# Patient Record
Sex: MALE | Race: WHITE | Employment: FULL TIME | URBAN - METROPOLITAN AREA
[De-identification: names, ages, dates, MRNs, and addresses within clinical notes are randomized per-mention and may not be internally consistent; named-entity substitution may affect disease eponyms.]

---

## 2021-08-02 ENCOUNTER — HOSPITAL ENCOUNTER (EMERGENCY)
Age: 31
Discharge: HOME | End: 2021-08-02
Attending: EMERGENCY MEDICINE

## 2021-08-02 ENCOUNTER — APPOINTMENT (OUTPATIENT)
Dept: GENERAL RADIOLOGY | Age: 31
End: 2021-08-02

## 2021-08-02 VITALS
BODY MASS INDEX: 35.54 KG/M2 | HEART RATE: 98 BPM | RESPIRATION RATE: 18 BRPM | OXYGEN SATURATION: 98 % | DIASTOLIC BLOOD PRESSURE: 64 MMHG | SYSTOLIC BLOOD PRESSURE: 150 MMHG | WEIGHT: 262.4 LBS | TEMPERATURE: 98.5 F | HEIGHT: 72 IN

## 2021-08-02 DIAGNOSIS — R07.9 ACUTE NONSPECIFIC CHEST PAIN WITH LOW RISK OF CORONARY ARTERY DISEASE: Primary | ICD-10-CM

## 2021-08-02 DIAGNOSIS — Z91.89 ACUTE NONSPECIFIC CHEST PAIN WITH LOW RISK OF CORONARY ARTERY DISEASE: Primary | ICD-10-CM

## 2021-08-02 DIAGNOSIS — R94.31 T WAVE INVERSION IN EKG: ICD-10-CM

## 2021-08-02 DIAGNOSIS — R51.9 NONINTRACTABLE HEADACHE, UNSPECIFIED CHRONICITY PATTERN, UNSPECIFIED HEADACHE TYPE: ICD-10-CM

## 2021-08-02 LAB
A/G RATIO: 1.5 (ref 1.1–2.2)
ALBUMIN SERPL-MCNC: 4.3 G/DL (ref 3.4–5)
ALP BLD-CCNC: 60 U/L (ref 40–129)
ALT SERPL-CCNC: 44 U/L (ref 10–40)
ANION GAP SERPL CALCULATED.3IONS-SCNC: 10 MMOL/L (ref 3–16)
AST SERPL-CCNC: 26 U/L (ref 15–37)
BASOPHILS ABSOLUTE: 0.1 K/UL (ref 0–0.2)
BASOPHILS RELATIVE PERCENT: 0.6 %
BILIRUB SERPL-MCNC: 0.4 MG/DL (ref 0–1)
BUN BLDV-MCNC: 17 MG/DL (ref 7–20)
CALCIUM SERPL-MCNC: 9.2 MG/DL (ref 8.3–10.6)
CHLORIDE BLD-SCNC: 104 MMOL/L (ref 99–110)
CO2: 23 MMOL/L (ref 21–32)
CREAT SERPL-MCNC: 0.8 MG/DL (ref 0.9–1.3)
D DIMER: <200 NG/ML DDU (ref 0–229)
EKG ATRIAL RATE: 90 BPM
EKG DIAGNOSIS: NORMAL
EKG P AXIS: 24 DEGREES
EKG P-R INTERVAL: 158 MS
EKG Q-T INTERVAL: 348 MS
EKG QRS DURATION: 76 MS
EKG QTC CALCULATION (BAZETT): 425 MS
EKG R AXIS: 42 DEGREES
EKG T AXIS: 41 DEGREES
EKG VENTRICULAR RATE: 90 BPM
EOSINOPHILS ABSOLUTE: 0.2 K/UL (ref 0–0.6)
EOSINOPHILS RELATIVE PERCENT: 1.5 %
GFR AFRICAN AMERICAN: >60
GFR NON-AFRICAN AMERICAN: >60
GLOBULIN: 2.9 G/DL
GLUCOSE BLD-MCNC: 118 MG/DL (ref 70–99)
HCT VFR BLD CALC: 46.1 % (ref 40.5–52.5)
HEMOGLOBIN: 15.6 G/DL (ref 13.5–17.5)
LIPASE: 30 U/L (ref 13–60)
LYMPHOCYTES ABSOLUTE: 3.2 K/UL (ref 1–5.1)
LYMPHOCYTES RELATIVE PERCENT: 30.5 %
MCH RBC QN AUTO: 30.5 PG (ref 26–34)
MCHC RBC AUTO-ENTMCNC: 33.7 G/DL (ref 31–36)
MCV RBC AUTO: 90.4 FL (ref 80–100)
MONOCYTES ABSOLUTE: 1 K/UL (ref 0–1.3)
MONOCYTES RELATIVE PERCENT: 10.1 %
NEUTROPHILS ABSOLUTE: 5.9 K/UL (ref 1.7–7.7)
NEUTROPHILS RELATIVE PERCENT: 57.3 %
PDW BLD-RTO: 14.4 % (ref 12.4–15.4)
PLATELET # BLD: 273 K/UL (ref 135–450)
PMV BLD AUTO: 8 FL (ref 5–10.5)
POTASSIUM SERPL-SCNC: 4.4 MMOL/L (ref 3.5–5.1)
PRO-BNP: 26 PG/ML (ref 0–124)
RBC # BLD: 5.1 M/UL (ref 4.2–5.9)
SARS-COV-2, NAAT: NOT DETECTED
SODIUM BLD-SCNC: 137 MMOL/L (ref 136–145)
TOTAL PROTEIN: 7.2 G/DL (ref 6.4–8.2)
TROPONIN: <0.01 NG/ML
TROPONIN: <0.01 NG/ML
WBC # BLD: 10.3 K/UL (ref 4–11)

## 2021-08-02 PROCEDURE — 83690 ASSAY OF LIPASE: CPT

## 2021-08-02 PROCEDURE — 93005 ELECTROCARDIOGRAM TRACING: CPT | Performed by: EMERGENCY MEDICINE

## 2021-08-02 PROCEDURE — 80053 COMPREHEN METABOLIC PANEL: CPT

## 2021-08-02 PROCEDURE — 71046 X-RAY EXAM CHEST 2 VIEWS: CPT

## 2021-08-02 PROCEDURE — 87635 SARS-COV-2 COVID-19 AMP PRB: CPT

## 2021-08-02 PROCEDURE — 85379 FIBRIN DEGRADATION QUANT: CPT

## 2021-08-02 PROCEDURE — 84484 ASSAY OF TROPONIN QUANT: CPT

## 2021-08-02 PROCEDURE — 99283 EMERGENCY DEPT VISIT LOW MDM: CPT

## 2021-08-02 PROCEDURE — 85025 COMPLETE CBC W/AUTO DIFF WBC: CPT

## 2021-08-02 PROCEDURE — 93005 ELECTROCARDIOGRAM TRACING: CPT | Performed by: PHYSICIAN ASSISTANT

## 2021-08-02 PROCEDURE — 83880 ASSAY OF NATRIURETIC PEPTIDE: CPT

## 2021-08-02 PROCEDURE — 93010 ELECTROCARDIOGRAM REPORT: CPT | Performed by: INTERNAL MEDICINE

## 2021-08-02 RX ORDER — DEXTROAMPHETAMINE SACCHARATE, AMPHETAMINE ASPARTATE, DEXTROAMPHETAMINE SULFATE AND AMPHETAMINE SULFATE 7.5; 7.5; 7.5; 7.5 MG/1; MG/1; MG/1; MG/1
30 TABLET ORAL DAILY
COMMUNITY

## 2021-08-02 RX ORDER — GABAPENTIN 100 MG/1
100 CAPSULE ORAL EVERY 8 HOURS PRN
Qty: 20 CAPSULE | Refills: 0 | Status: SHIPPED | OUTPATIENT
Start: 2021-08-02 | End: 2021-08-12

## 2021-08-02 RX ORDER — NAPROXEN 500 MG/1
500 TABLET ORAL 2 TIMES DAILY WITH MEALS
Qty: 20 TABLET | Refills: 0 | Status: SHIPPED | OUTPATIENT
Start: 2021-08-02 | End: 2021-08-12

## 2021-08-02 ASSESSMENT — ENCOUNTER SYMPTOMS
RHINORRHEA: 0
VOMITING: 0
NAUSEA: 0
SHORTNESS OF BREATH: 0
DIARRHEA: 0
COUGH: 0
ABDOMINAL PAIN: 0

## 2021-08-02 ASSESSMENT — HEART SCORE
ECG: 1
ECG: 1

## 2021-08-02 ASSESSMENT — PAIN SCALES - GENERAL: PAINLEVEL_OUTOF10: 0

## 2021-08-02 NOTE — ED PROVIDER NOTES
I independently performed a history and physical on Cece Harrison. All diagnostic, treatment, and disposition decisions were made by myself in conjunction with the advanced practice provider. Briefly, this is a 32 y.o. male here for headache ongoing for the past week. Patient describes the pain starting in the back of his head and radiating forward to the top of his left scalp. Pain feels like \"jolts\" and lasts for seconds at a time, however feels severe when the pain occurs. Nothing seems to bring it on or make it worse. He denies any head injury or other trauma. Over the past 2 days he states he is having some sharp jolting pain in his left upper chest as well. Patient went to an urgent care where he had an EKG performed, was told it was abnormal and was instructed to proceed to the emergency department for further evaluation. At time of my evaluation he denies any current pain or other symptoms. On exam, patient afebrile and nontoxic. No distress. Heart RRR. Lungs CTAB. Abdomen soft, nondistended, nontender to palpation in all quadrants. A&Ox4. PERRL. There is tenderness over palpation of the left occiput. CN 2-12 intact. 5/5 motor and sensation grossly intact all extremities. No pronator drift. Normal gait observed.         EKG  EKG was reviewed by emergency department physician in the absence of a cardiologist    Narrow complex sinus rhythm, rate 90, normal axis, normal AK and QRS intervals, normal Qtc, no ST elevations or depressions, TWI V4-V6, impression NSR with nonspecific T wave morphology, no STEMI, no comparison available      Screenings     Heart Score for chest pain patients  History: Slightly Suspicious  ECG: Non-Specifc repolarization disturbance/LBTB/PM  Patient Age: < 45 years  *Risk factors for Atherosclerotic disease: Cigarette smoking, Positive family History, Obesity  Risk Factors: > 3 Risk factors or history of atherosclerotic disease*  Troponin: < 1X normal limit  Heart Score Total: 3      MDM    Patient afebrile and nontoxic. No distress. EKG with nonspecific lateral T wave inversions, no STEMI. A troponin is normal, ACS is not immediately evident. Patient symptoms not typical for angina. Chest x-ray evidence of pneumonia, pneumothorax or other acute process. D-dimer normal, low risk for pulmonary embolism. Patient had repeat EKG and troponin at 4 hours which were unchanged. He is low risk by HEART score and is currently chest pain-free. Neuro exam is nonfocal, no red flags for SAH/ICH for headache. Given his descriptions of his symptoms I question occipital neuralgia. Will trial patient on anti-inflammatories and low-dose gabapentin. Felt safe for discharge to self-care with close PCP and cardiology follow-up. I held lengthy discussion with patient regarding his symptoms and the results of testing, he is agreeable with plan and feels comfortable turning to home. Will be able to return easily should his symptoms change or worsen. Strict immediate return precautions were discussed. Patient Referrals:  Makeda Barcenas  [address], [address] [place] [address]  [phone]    In 2 days      Tito Wallace MD  [address] [facility]. [address] [facility] [address]    In 3 days  Cardiology - for your abnormal EKG      Discharge Medications:  New Prescriptions    GABAPENTIN (NEURONTIN) 100 MG CAPSULE    Take 1 capsule by mouth every 8 hours as needed (headache) for up to 10 days. Intended supply: 90 days    NAPROXEN (NAPROSYN) 500 MG TABLET    Take 1 tablet by mouth 2 times daily (with meals) for 10 days       FINAL IMPRESSION  1. Acute nonspecific chest pain with low risk of coronary artery disease    2. Nonintractable headache, unspecified chronicity pattern, unspecified headache type    3. T wave inversion in EKG        Blood pressure (!) 150/64, pulse 98, temperature 98.5 °F (36.9 °C), temperature source Oral, resp.  rate 18, height 6' (1.829 m), weight 262 lb 6.4 oz (119 kg), SpO2 98 %. For further details of [facility] emergency department encounter, please see documentation by advanced practice provider, TINY Plata.     Dana Hair DO (electronically signed)  Attending Emergency Physician       Dana Hair DO  08/03/21 6959

## 2021-08-02 NOTE — ED PROVIDER NOTES
EKG is reviewed by myself. Dated today at 18. Rate 90. Sinus rhythm. Some nonspecific T wave inversions in V4 through 6.   No priors     Sudheer Eli MD  08/02/21 2460

## 2021-08-02 NOTE — ED PROVIDER NOTES
[facility]        Pt Name: Danielle Cornejo  MRN: [id number]  [place] 1990  Date of evaluation: 8/2/2021  Provider: Claudio Cooper PA-C  PCP: Flip You [facility]  Note Started: 2:44 PM EDT        I have seen and evaluated this patient with my supervising physician Sudha Ga, [address] [place] [address]       Chief Complaint   Patient presents with    Chest Pain     pt having chest pain, sob, sharp pain in his head. went to urgent care, abn ekg, sent to ed       HISTORY OF PRESENT ILLNESS   (Location, Timing/Onset, Context/Setting, Quality, Duration, Modifying Factors, Severity, Associated Signs and Symptoms)  Note limiting factors. Chief Complaint: Chest pain    Danielle Cornejo is a 32 y.o. male who presents to the emergency department today for evaluation for chest pain, and shortness of breath. The patient states that over the past 2days he has been experiencing intermittent sharp pains to the left anterior chest and he states occasionally will have sharp pains to his head as well. Patient states that he will have associated shortness of breath with his pain. The patient states that he is asymptomatic at this time. He states that he did have pain earlier today, he states that he was seen at urgent care and he was sent to the emergency room because he had an abnormal EKG. Again when the patient arrived to the ED he is asymptomatic. The patient denies any history of hypertension diabetes or hyperlipidemia. He smokes a pack a day. He denies any drug use. The patient states that he recently traveled out [address] [place] [address], was out [address] [place] [address] for 3 weeks. He has no lower leg pain or swelling. He is no history of DVT or PE. He has no fever chills. No cough or congestion. He says that dad had an MI at age 46. Patient denies any abdominal pain, vomiting or diarrhea. He has no urinary symptoms.   The patient states that he has had symptoms similar in the past, he states that he was told that he had a panic attack over this is in his 25s. Again he is asymptomatic at this time otherwise has no complaints    Nursing Notes were all reviewed and agreed with or any disagreements were addressed in the HPI. REVIEW OF SYSTEMS    (2-9 systems for level 4, 10 or more for level 5)     Review of Systems   Constitutional: Negative for activity change, appetite change, chills and fever. HENT: Negative for congestion and rhinorrhea. Respiratory: Negative for cough and shortness of breath. Cardiovascular: Positive for chest pain. Gastrointestinal: Negative for abdominal pain, diarrhea, nausea and vomiting. Genitourinary: Negative for difficulty urinating, dysuria and hematuria. Positives and Pertinent negatives as per HPI. Except as noted above in the ROS, all other systems were reviewed and negative. PAST MEDICAL HISTORY   History reviewed. No pertinent past medical history. SURGICAL HISTORY     Past Surgical History:   Procedure Laterality Date    APPENDECTOMY           CURRENTMEDICATIONS       Discharge Medication List as of 8/2/2021  6:58 PM      CONTINUE these medications which have NOT CHANGED    Details   amphetamine-dextroamphetamine (ADDERALL) 30 MG tablet Take 30 mg by mouth daily. Historical Med               ALLERGIES     Patient has no known allergies.     FAMILYHISTORY       Family History   Problem Relation Age of Onset    Coronary Art Dis Father     Heart Attack Father     Heart Disease Father     Heart Disease Paternal Grandfather     High Blood Pressure Paternal Grandfather           SOCIAL HISTORY       Social History     Tobacco Use    Smoking status: Current Every Day Smoker     Packs/day: 1.00    Smokeless tobacco: Never Used   Substance Use Topics    Alcohol use: Not on file    Drug use: Not on file       SCREENINGS      Heart Score for chest pain patients  History: Slightly Suspicious  ECG: Non-Specifc repolarization disturbance/LBTB/PM  Patient Age: < 39 years  *Risk factors for Atherosclerotic disease: Cigarette smoking, Positive family History, Obesity  Risk Factors: > 3 Risk factors or history of atherosclerotic disease*  Troponin: < 1X normal limit  Heart Score Total: 3      PHYSICAL EXAM    (up to 7 for level 4, 8 or more for level 5)     ED Triage Vitals [08/02/21 1257]   BP Temp Temp Source Pulse Resp SpO2 Height Weight   (!) 150/64 98.5 °F (36.9 °C) Oral 98 18 98 % 6' (1.829 m) 262 lb 6.4 oz (119 kg)       Physical Exam  Vitals and nursing note reviewed. Constitutional:       Appearance: He is well-developed. He is not diaphoretic. HENT:      Head: Normocephalic and atraumatic. Right Ear: External ear normal.      Left Ear: External ear normal.      Nose: Nose normal.   Eyes:      General:         Right eye: No discharge. Left eye: No discharge. Neck:      Trachea: No tracheal deviation. Cardiovascular:      Rate and Rhythm: Normal rate and regular rhythm. Heart sounds: No murmur heard. Pulmonary:      Effort: Pulmonary effort is normal. No respiratory distress. Breath sounds: Normal breath sounds. No wheezing or rales. Abdominal:      General: Bowel sounds are normal. There is no distension. Palpations: Abdomen is soft. Tenderness: There is no abdominal tenderness. There is no guarding. Musculoskeletal:         General: Normal range of motion. Cervical back: Normal range of motion and neck supple. Skin:     General: Skin is warm and dry. Neurological:      Mental Status: He is alert and oriented to person, place, and time.    Psychiatric:         Behavior: Behavior normal.         DIAGNOSTIC RESULTS   LABS:    Labs Reviewed   COMPREHENSIVE METABOLIC PANEL - Abnormal; Notable for the following components:       Result Value    Glucose 118 (*)     CREATININE 0.8 (*)     ALT 44 (*)     All other components within normal limits    Narrative:     Performed at:  [facility]  [address],  [place], [address]   Phone [phone], RAPID    Narrative:     Performed at:  [facility]  [address], [address]   Phone [phone]   CBC WITH AUTO DIFFERENTIAL    Narrative:     Performed at:  [facility]  [address],  [place], [address]   Phone [phone]   LIPASE    Narrative:     Performed at:  [facility]  [address],  [place], [address]   Phone [phone]   TROPONIN    Narrative:     Performed at:  [facility] [address] [place], [address]   Phone [phone]   D-DIMER, QUANTITATIVE    Narrative:     Performed at:  [facility] [address],  [place], [address]   Phone [phone]   BRAIN NATRIURETIC PEPTIDE    Narrative:     Performed at:  [facility]  [address]  [place], [address]   Phone [phone]   TROPONIN    Narrative:     Performed at:  [facility] [address] [place], [address]   Phone [phone]       When ordered only abnormal lab results are displayed. All other labs were within normal range or not returned as of this dictation. EKG: When ordered, EKG's are interpreted by the Emergency Department Physician in the absence of a cardiologist.  Please see their note for interpretation of EKG.     RADIOLOGY:   Non-plain film images such as CT, Ultrasound and MRI are read by the radiologist. Plain radiographic images are visualized and preliminarily interpreted by the ED Provider with the below findings:        Interpretation per the Radiologist below, if available at the time of this note:    XR CHEST (2 VW)   Final Result   No active cardiopulmonary disease           XR CHEST (2 VW)    Result Date: 8/2/2021  EXAMINATION: TWO XRAY VIEWS OF THE CHEST 8/2/2021 1:49 pm COMPARISON: None. HISTORY: ORDERING SYSTEM PROVIDED HISTORY: Chest Discomfort TECHNOLOGIST PROVIDED HISTORY: Reason for exam:->Chest Discomfort Reason for Exam: chest discomfort Acuity: Acute Type of Exam: Initial FINDINGS: Heart size and pulmonary vasculature within normal limits. Lungs clear. Costophrenic angles sharp     No active cardiopulmonary disease           PROCEDURES   Unless otherwise noted below, none     Procedures    CRITICAL CARE TIME   N/A    CONSULTS:  None      EMERGENCY DEPARTMENT COURSE and DIFFERENTIAL DIAGNOSIS/MDM:   Vitals:    Vitals:    08/02/21 1257   BP: (!) 150/64   Pulse: 98   Resp: 18   Temp: 98.5 °F (36.9 °C)   TempSrc: Oral   SpO2: 98%   Weight: 262 lb 6.4 oz (119 kg)   Height: 6' (1.829 m)       Patient was given the following medications:  Medications - No data to display        Briefly, this is a 49-year-old male who presents to the emergency department today for evaluation for chest pain. The patient states that he has been experiencing intermittent chest pain to the left anterior chest which will occasionally radiate up into his head and he states that this is actually been ongoing for the past 2 days. He is asymptomatic at this time. He states that he was seen at urgent care and was sent to the ED for an abnormal EKG    Physical examination is unremarkable    EKG does show T wave inversion to the lateral leads, please see attending note for further details    Patient has a heart score of 1    CBC shows no evidence of leukocytosis or anemia. CMP is unremarkable. Lipase is normal.  Troponin negative. D-dimer negative. Rapid Covid is negative. Chest x-ray is now    Patient is low risk heart score, he is negative D-dimer with a low Wells score, therefore repeat troponin EKG will be obtained at 3-hour this marks in my shift, please see attending note for details and final disposition    FINAL IMPRESSION      1.  Acute nonspecific chest pain with low risk of coronary artery disease    2. Nonintractable headache, unspecified chronicity pattern, unspecified headache type    3. T wave inversion in EKG          DISPOSITION/PLAN   DISPOSITION Decision To Discharge 08/02/2021 06:47:59 PM      PATIENT REFERRED TO:  Carolyn Contreras  [address], [address] [facility]  [phone]    In 2 days      Js Harrison MD  [address] [facility]. [address]    In 3 days  Cardiology - for your abnormal EKG      DISCHARGE MEDICATIONS:  Discharge Medication List as of 8/2/2021  6:58 PM      START taking these medications    Details   naproxen (NAPROSYN) 500 MG tablet Take 1 tablet by mouth 2 times daily (with meals) for 10 days, Disp-20 tablet, R-0Print      gabapentin (NEURONTIN) 100 MG capsule Take 1 capsule by mouth every 8 hours as needed (headache) for up to 10 days.  Intended supply: 90 days, Disp-20 capsule, R-0Print             DISCONTINUED MEDICATIONS:  Discharge Medication List as of 8/2/2021  6:58 PM                 (Please note that portions of this note were completed with a voice recognition program.  Efforts were made to edit the dictations but occasionally words are mis-transcribed.)    Dee Baumgarten, PA-C (electronically signed)            Dee Baumgarten, PA-C  08/03/21 2189

## 2021-08-04 LAB
EKG ATRIAL RATE: 83 BPM
EKG DIAGNOSIS: NORMAL
EKG P AXIS: 11 DEGREES
EKG P-R INTERVAL: 142 MS
EKG Q-T INTERVAL: 384 MS
EKG QRS DURATION: 88 MS
EKG QTC CALCULATION (BAZETT): 451 MS
EKG R AXIS: 36 DEGREES
EKG T AXIS: 59 DEGREES
EKG VENTRICULAR RATE: 83 BPM

## 2021-08-04 PROCEDURE — 93010 ELECTROCARDIOGRAM REPORT: CPT | Performed by: INTERNAL MEDICINE

## 2021-08-05 PROBLEM — R07.9 CHEST PAIN: Status: ACTIVE | Noted: 2021-08-05

## 2021-08-05 NOTE — PROGRESS NOTES
[facility]   Cardiac Evaluation      Patient: Chitra Grove  YOB: 1990         Chief Complaint   Patient presents with    Abnormal Test Results     Patient here today for abnormal ekg         Referring provider: Lise Goodson    History of Present Illness:   Chitra Grove is a 32 y.o. male here as a new patient referred by his pcp for chest pain. He was also evaluated at [facility] ER for chest pain. EKG was normal, troponin negative. He was discharged in stable condition and told to follow up as outpatient. Today Forrest Lira has not had any issues with chest pain or discomfort. He describes the day he went to the ER as neck pain and a headache. 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 He has a family history of heart disease, father MI last year and grandfather had CHF ( in his [de-identified]). He is a current every day smoker (about 1 ppd), he is trying to quit but is stressed with work. He is recently taking adderall and tolerating it well. With regard to medication therapy he/she has been compliant with prescribed regimen and has tolerated therapy to date. Past Medical History:   has no past medical history on file. Surgical History:   has a past surgical history that includes Appendectomy. Current Outpatient Medications   Medication Sig Dispense Refill    amphetamine-dextroamphetamine (ADDERALL) 30 MG tablet Take 30 mg by mouth daily.  naproxen (NAPROSYN) 500 MG tablet Take 1 tablet by mouth 2 times daily (with meals) for 10 days 20 tablet 0    gabapentin (NEURONTIN) 100 MG capsule Take 1 capsule by mouth every 8 hours as needed (headache) for up to 10 days. Intended supply: 90 days 20 capsule 0     No current facility-administered medications for this visit. Allergies:  Patient has no known allergies.      Social History:  Social History     Socioeconomic History    Marital status: Single     Spouse name: Not on file    Number of children: Not on file    Years of education: Not on changes   · ENT: No Headaches, hearing loss or vertigo. No mouth sores or sore throat. · Cardiovascular: Reviewed in HPI  · Respiratory: No cough or wheezing, no sputum production. · Gastrointestinal: No abdominal pain, appetite loss, blood in stools. No change in bowel or bladder habits. · Genitourinary: No nocturia, dysuria, trouble voiding  · Musculoskeletal:  No gait disturbance, weakness or joint complaints. · Integumentary: No rash or pruritis. · Neurological: No headache, change in muscle strength, numbness or tingling. No change in gait, balance, coordination, mood, affect, memory, mentation, behavior. · Psychiatric: No anxiety or depression  · Endocrine: No malaise or fever  · Hematologic/Lymphatic: No abnormal bruising or bleeding, blood clots or swollen lymph nodes. · Allergic/Immunologic: No nasal congestion or hives. Physical Examination:    Vitals:    08/06/21 1016   BP: 112/60   Site: Right Upper Arm   Position: Sitting   Cuff Size: Large Adult   Pulse: 84   Resp: 18   SpO2: 98%   Weight: 261 lb 6.4 oz (118.6 kg)   Height: 6' (1.829 m)     Body mass index is 35.45 kg/m². Wt Readings from Last 3 Encounters:   08/06/21 261 lb 6.4 oz (118.6 kg)   08/02/21 262 lb 6.4 oz (119 kg)      BP Readings from Last 3 Encounters:   08/06/21 112/60   08/02/21 (!) 150/64        Physical Examination:    · CONSTITUTIONAL: Well developed, well nourished  · EYES: PERRLA. No xanthelasma, sclera non icteric  · EARS,NOSE,MOUTH,THROAT:  Mucous membranes moist, normal hearing  · NECK: Supple, JVP normal, thyroid not enlarged. Carotids 2+ without bruits  · RESPIRATORY: Normal effort, no rales or rhonchi  · CARDIOVASCULAR: Normal PMI, regular rate and rhythm, no murmurs, rub or gallop. No edema. Radial pulses present and equal  · CHEST: No scar or masses  · ABDOMEN: Normal bowel sounds. No masses or tenderness. No bruit  · MUSCULOSKELETAL: No clubbing or cyanosis. Moves all extremities well.  Normal gait  · SKIN: Warm and dry. No rashes  · NEUROLOGIC: Cranial nerves intact. Alert and oriented  · PSYCHIATRIC: Calm affect. Appears to have normal judgement and insight    All testing and labs listed below were personally reviewed by myself. Assessment/Plan  1. Chest pain, unspecified type    2. Hyperlipidemia, unspecified hyperlipidemia type          Chest pain   Angina ~ 2 episodes of atypical chest pain. Significant family history and multiple risk factors. CCS class 2  Intervention  EKG- abnormal T waves  Current meds~ n/a  Plan~ stress echo, smoking cessation, lipid panel (none to compare)      Orders Placed This Encounter   Procedures    LIPID PANEL    Echo stress test       Follow up based on stress echo findings    Lamont Louie MD      Thank you for allowing to me to participate in the care of Brenda Silvestre. Scribe's Attestation: This note was scribed in the presence of Dr. Ramesh Coello MD by Tomi Storey RN. 08/06/21     I, Dr. Ramesh Coello, personally performed the services described in this documentation, as scribed by the above signed scribe in my presence. It is both accurate and complete to my knowledge. I agree with the details independently gathered by the clinical support staff, while the remaining scribed note accurately describes my personal service to the patient.

## 2021-08-06 ENCOUNTER — OFFICE VISIT (OUTPATIENT)
Dept: CARDIOLOGY CLINIC | Age: 31
End: 2021-08-06

## 2021-08-06 ENCOUNTER — HOSPITAL ENCOUNTER (OUTPATIENT)
Age: 31
Discharge: HOME | End: 2021-08-06

## 2021-08-06 VITALS
SYSTOLIC BLOOD PRESSURE: 112 MMHG | OXYGEN SATURATION: 98 % | HEART RATE: 84 BPM | DIASTOLIC BLOOD PRESSURE: 60 MMHG | RESPIRATION RATE: 18 BRPM | BODY MASS INDEX: 35.41 KG/M2 | WEIGHT: 261.4 LBS | HEIGHT: 72 IN

## 2021-08-06 DIAGNOSIS — E78.5 HYPERLIPIDEMIA, UNSPECIFIED HYPERLIPIDEMIA TYPE: ICD-10-CM

## 2021-08-06 DIAGNOSIS — R07.9 CHEST PAIN, UNSPECIFIED TYPE: Primary | ICD-10-CM

## 2021-08-06 LAB
CHOLESTEROL, TOTAL: 172 MG/DL (ref 0–199)
HDLC SERPL-MCNC: 37 MG/DL (ref 40–60)
LDL CHOLESTEROL CALCULATED: 117 MG/DL
TRIGL SERPL-MCNC: 92 MG/DL (ref 0–150)
VLDLC SERPL CALC-MCNC: 18 MG/DL

## 2021-08-06 PROCEDURE — 99244 OFF/OP CNSLTJ NEW/EST MOD 40: CPT | Performed by: INTERNAL MEDICINE

## 2021-08-06 PROCEDURE — 80061 LIPID PANEL: CPT

## 2021-08-06 PROCEDURE — 36415 COLL VENOUS BLD VENIPUNCTURE: CPT

## 2021-08-06 NOTE — ASSESSMENT & PLAN NOTE
Angina ~ 2 episodes of atypical chest pain. Significant family history and multiple risk factors.   CCS class 2  Intervention  EKG- abnormal T waves  Current meds~ n/a  Plan~ stress echo, smoking cessation, lipid panel (none to compare)

## 2021-08-06 NOTE — PATIENT INSTRUCTIONS
Schedule a stress echo - hold off on your adderall that morning, you can take it after.    Stop smoking  Have blood drawn to check your cholesterol (fasting 8 hrs)

## 2021-08-19 ENCOUNTER — HOSPITAL ENCOUNTER (OUTPATIENT)
Dept: NON INVASIVE DIAGNOSTICS | Age: 31
Discharge: HOME | End: 2021-08-19

## 2021-08-19 DIAGNOSIS — R07.9 CHEST PAIN, UNSPECIFIED TYPE: ICD-10-CM

## 2021-08-19 LAB
LV EF: 50 %
LVEF MODALITY: NORMAL

## 2021-08-19 PROCEDURE — 93320 DOPPLER ECHO COMPLETE: CPT

## 2021-08-19 PROCEDURE — 93351 STRESS TTE COMPLETE: CPT
